# Patient Record
Sex: FEMALE | Race: BLACK OR AFRICAN AMERICAN | NOT HISPANIC OR LATINO | Employment: UNEMPLOYED | URBAN - METROPOLITAN AREA
[De-identification: names, ages, dates, MRNs, and addresses within clinical notes are randomized per-mention and may not be internally consistent; named-entity substitution may affect disease eponyms.]

---

## 2023-09-15 ENCOUNTER — HOSPITAL ENCOUNTER (EMERGENCY)
Facility: HOSPITAL | Age: 38
Discharge: HOME | End: 2023-09-16
Attending: EMERGENCY MEDICINE
Payer: MEDICAID

## 2023-09-15 DIAGNOSIS — E86.0 DEHYDRATION: Primary | ICD-10-CM

## 2023-09-15 PROCEDURE — 99281 EMR DPT VST MAYX REQ PHY/QHP: CPT

## 2023-09-16 VITALS
TEMPERATURE: 99 F | HEIGHT: 67 IN | SYSTOLIC BLOOD PRESSURE: 114 MMHG | HEART RATE: 75 BPM | BODY MASS INDEX: 20.4 KG/M2 | RESPIRATION RATE: 16 BRPM | DIASTOLIC BLOOD PRESSURE: 75 MMHG | OXYGEN SATURATION: 98 % | WEIGHT: 130 LBS

## 2023-09-16 NOTE — ED PROVIDER NOTES
"Encounter Date: 9/15/2023       History     Chief Complaint   Patient presents with    Hypotension     BIB ems for "low blood pressure" from [facility]. Bp currently in the 90s systolic     HPI    The patient is a 39 y/o female with history of heroine and cocaine use, who presents to the ED for hypotension. States that she was brought to main campus from [place] due to low blood pressure. She is not aware of what her baseline BP is. Was also at [facility] earlier today prior to entering detox at [place]. She states that her last use of heroine and cocaine was 4 days ago. She also not eaten anything for 4 days and has had limited fluid intake during that time period as well. Her last bowel movement was yesterday. Denies any withdrawal symptoms. Denies nausea, vomiting, headache, lightheadedness, dizziness, dysuria/hematuria, fever, chills. She has no other complaints or concerns    Review of patient's allergies indicates:  No Known Allergies  Past Medical History:   Diagnosis Date    Cocaine use     Heroin use      No past surgical history on file.  No family history on file.     Review of Systems   All other systems reviewed and are negative.      Physical Exam     Initial Vitals [09/15/23 2333]   BP Pulse Resp Temp SpO2   98/67 70 14 98.1 °F (36.7 °C) 99 %      MAP       --         Physical Exam    Nursing note and vitals reviewed.  Constitutional:   Chronically ill-appearing no distress   HENT:   Head: Normocephalic and atraumatic.   Eyes: Pupils are equal, round, and reactive to light.   Neck:   Normal range of motion.  Cardiovascular:  Normal rate, regular rhythm and normal heart sounds.           Pulmonary/Chest: Breath sounds normal. No respiratory distress.   Abdominal: Abdomen is soft. She exhibits no distension. There is no abdominal tenderness.   Musculoskeletal:         General: Normal range of motion.      Cervical back: Normal range of motion.     Neurological: She is alert and oriented to person, place, " and time. She has normal strength. GCS score is 15. GCS eye subscore is 4. GCS verbal subscore is 5. GCS motor subscore is 6.   Skin: Skin is warm and dry.   Psychiatric: She has a normal mood and affect. Thought content normal.         ED Course   Procedures  Labs Reviewed   HIV 1 / 2 ANTIBODY   HEPATITIS C ANTIBODY   CBC W/ AUTO DIFFERENTIAL   COMPREHENSIVE METABOLIC PANEL   URINALYSIS, REFLEX TO URINE CULTURE   POCT URINE PREGNANCY          Imaging Results    None          Medications   sodium chloride 0.9% bolus 1,000 mL 1,000 mL (has no administration in time range)     Medical Decision Making  38-year-old female presents the ER for evaluation hypotension.  She is presenting from [facility] for polysubstance abuse.  Patient reports she has not been eating or drinking.  She denies any fever chills.  At [place] her blood pressure was 90 systolic she came the ER for further evaluation she has no complaints well-appearing no acute distress physical exam reassuring.  Likely hypotension due to dehydration will plan blood work to assess for PANCHO infection reassess likely discharge.    Amount and/or Complexity of Data Reviewed  Labs: ordered.    Risk  Decision regarding hospitalization.  Risk Details: History of polysubstance abuse               ED Course as of 09/16/23 0420   Sat Sep 16, 2023   0308 Resting in bed no acute distress patient declined blood work at this time.  She was a difficult IV stick, multiple attempts.  I offered to attempt but patient declined.  Patient wishes to go home, she currently has remained hemodynamically stable, there is no sign of sepsis.  Her transient hypotension likely related to dehydration and withdrawal.  She will be discharged.  She is tolerating p.o..  Return precautions discussed. [SE]      ED Course User Index  [SE] Gallo Hampton MD                    Clinical Impression:   Final diagnoses:  [E86.0] Dehydration (Primary)        ED Disposition Condition    Discharge  Stable          ED Prescriptions    None       Follow-up Information    None          Gallo Hampton MD  09/16/23 3108

## 2023-09-16 NOTE — ED NOTES
09/15/23 2333 09/16/23 0001 09/16/23 0002   Vital Signs   BP 98/67 101/62 108/65      09/16/23 0033 09/16/23 0048 09/16/23 0103   Vital Signs   /64 (!) 100/59 114/70      09/16/23 0217 09/16/23 0303   Vital Signs   /63 113/69         Blood pressure readings throughout ED stay

## 2023-09-16 NOTE — ED TRIAGE NOTES
"[place], a 38 y.o. female presents to the ED via EMS for hypotension. She was trying to present for detox at [facility] (for heroin and cocaine) prior to arrival and her BP there was 80s/50s, so 911 was called and brought her here. They report SBP 90s en route. Patient denies any pain. She is requesting a snack.    Reports "a little" cocaine and heroin use today. She would like to go back to [facility] for detox    Triage note:  Chief Complaint   Patient presents with    Hypotension     BIB ems for "low blood pressure" from [facility]. Bp currently in the 90s systolic     Review of patient's allergies indicates:  No Known Allergies  Past Medical History:   Diagnosis Date    Cocaine use     Heroin use            LOC: The patient is awake, alert, and oriented to self, place, time, and situation. Pt is calm and cooperative. Blunt affect.    APPEARANCE: Patient resting comfortably in no acute distress.  Eyes closed but is awake and alert. Reports she is hungry. Slightly disheveled clothing.    SKIN: The skin is warm and dry; color consistent with ethnicity.  Patient has normal skin turgor and moist mucus membranes.  Multiple areas of tenderness/firmness throughout the bilateral arms, patient reports these are injection sites. No overlying skin change in these areas that I appreciate, no visible drainage.    MUSCULOSKELETAL: Patient moving upper and lower extremities without difficulty; denies pain in the extremities or back.  Denies weakness.     RESPIRATORY: Airway is open and patent. Respirations spontaneous, even, easy, and non-labored.  No audible wheeze. Patient has a normal effort and rate.  No accessory muscle use noted. Denies cough.     CARDIAC:  Normal rate noted.  No peripheral edema noted. 2+ radial pulses bilaterally. No complaints of chest pain.      ABDOMEN: Soft and non tender to palpation.  No distention noted. Pt denies abdominal pain; denies nausea, vomiting, diarrhea, or " constipation.    NEUROLOGIC: Eyes open spontaneously.   Follows commands; facial expression symmetrical.  Purposeful motor response noted; normal sensation in all extremities. Pt denies headache; denies lightheadedness or dizziness; denies visual disturbances; denies loss of balance; denies unilateral weakness.